# Patient Record
Sex: FEMALE | Race: WHITE | NOT HISPANIC OR LATINO | Employment: FULL TIME | URBAN - METROPOLITAN AREA
[De-identification: names, ages, dates, MRNs, and addresses within clinical notes are randomized per-mention and may not be internally consistent; named-entity substitution may affect disease eponyms.]

---

## 2023-05-09 DIAGNOSIS — D05.11 DUCTAL CARCINOMA IN SITU (DCIS) OF RIGHT BREAST: Primary | ICD-10-CM

## 2023-05-31 ENCOUNTER — OFFICE VISIT (OUTPATIENT)
Dept: HEMATOLOGY/ONCOLOGY | Facility: CLINIC | Age: 58
End: 2023-05-31
Payer: COMMERCIAL

## 2023-05-31 VITALS
SYSTOLIC BLOOD PRESSURE: 143 MMHG | BODY MASS INDEX: 34.11 KG/M2 | OXYGEN SATURATION: 97 % | WEIGHT: 185.38 LBS | RESPIRATION RATE: 20 BRPM | TEMPERATURE: 98 F | HEIGHT: 62 IN | HEART RATE: 76 BPM | DIASTOLIC BLOOD PRESSURE: 87 MMHG

## 2023-05-31 DIAGNOSIS — D05.11 DUCTAL CARCINOMA IN SITU (DCIS) OF RIGHT BREAST: ICD-10-CM

## 2023-05-31 PROCEDURE — 3077F SYST BP >= 140 MM HG: CPT | Mod: CPTII,,, | Performed by: INTERNAL MEDICINE

## 2023-05-31 PROCEDURE — 1159F PR MEDICATION LIST DOCUMENTED IN MEDICAL RECORD: ICD-10-PCS | Mod: CPTII,,, | Performed by: INTERNAL MEDICINE

## 2023-05-31 PROCEDURE — 3079F PR MOST RECENT DIASTOLIC BLOOD PRESSURE 80-89 MM HG: ICD-10-PCS | Mod: CPTII,,, | Performed by: INTERNAL MEDICINE

## 2023-05-31 PROCEDURE — 3008F BODY MASS INDEX DOCD: CPT | Mod: CPTII,,, | Performed by: INTERNAL MEDICINE

## 2023-05-31 PROCEDURE — 99205 PR OFFICE/OUTPT VISIT, NEW, LEVL V, 60-74 MIN: ICD-10-PCS | Mod: ,,, | Performed by: INTERNAL MEDICINE

## 2023-05-31 PROCEDURE — 3008F PR BODY MASS INDEX (BMI) DOCUMENTED: ICD-10-PCS | Mod: CPTII,,, | Performed by: INTERNAL MEDICINE

## 2023-05-31 PROCEDURE — 99205 OFFICE O/P NEW HI 60 MIN: CPT | Mod: ,,, | Performed by: INTERNAL MEDICINE

## 2023-05-31 PROCEDURE — 1160F RVW MEDS BY RX/DR IN RCRD: CPT | Mod: CPTII,,, | Performed by: INTERNAL MEDICINE

## 2023-05-31 PROCEDURE — 3079F DIAST BP 80-89 MM HG: CPT | Mod: CPTII,,, | Performed by: INTERNAL MEDICINE

## 2023-05-31 PROCEDURE — 1160F PR REVIEW ALL MEDS BY PRESCRIBER/CLIN PHARMACIST DOCUMENTED: ICD-10-PCS | Mod: CPTII,,, | Performed by: INTERNAL MEDICINE

## 2023-05-31 PROCEDURE — 3077F PR MOST RECENT SYSTOLIC BLOOD PRESSURE >= 140 MM HG: ICD-10-PCS | Mod: CPTII,,, | Performed by: INTERNAL MEDICINE

## 2023-05-31 PROCEDURE — 1159F MED LIST DOCD IN RCRD: CPT | Mod: CPTII,,, | Performed by: INTERNAL MEDICINE

## 2023-05-31 RX ORDER — ROSUVASTATIN CALCIUM 5 MG/1
TABLET, COATED ORAL
COMMUNITY

## 2023-05-31 RX ORDER — TRAZODONE HYDROCHLORIDE 50 MG/1
TABLET ORAL
COMMUNITY

## 2023-05-31 RX ORDER — VORTIOXETINE 10 MG/1
1 TABLET, FILM COATED ORAL
COMMUNITY

## 2023-05-31 RX ORDER — LISDEXAMFETAMINE DIMESYLATE 70 MG/1
70 CAPSULE ORAL EVERY MORNING
COMMUNITY
Start: 2023-05-10

## 2023-05-31 RX ORDER — LOSARTAN POTASSIUM AND HYDROCHLOROTHIAZIDE 12.5; 5 MG/1; MG/1
TABLET ORAL
COMMUNITY

## 2023-05-31 RX ORDER — LEVOTHYROXINE SODIUM 75 UG/1
75 TABLET ORAL
COMMUNITY
Start: 2023-05-15

## 2023-05-31 NOTE — PROGRESS NOTES
Cancer Center [facility]  Hematology/Oncology  Consult Note    Patient Name: Kathrin Swartz  MRN: [id number]  Admission Date: (Not on file)  Hospital Length of Stay: 0 days  Code Status: [unfilled]   Attending Provider:  Hussein Hayden MD  Consulting Provider: Alex Campos MD  Primary Care Physician:  Tawana Eng MD  Principal Problem:  DCIS of the right breast, without comedo necrosis, measuring only 2 centimeters, with all clear margins.    Consults  Subjective:   HPI:  Ms. Swartz, is a 58 years old lady, referred by Dr. Hayden.  Her primary care physician Dr. Tawana Eng, obtain a routine mammogram, in January 26, 2023.  There were calcifications in the upper outer right breast, there were of indeterminate etiology, and a stereotactic biopsy was recommended based on suspicious abnormality by mammogram BI-RADS 4.  On March 27, 2023 she underwent a stereotactic biopsy that revealed DCIS.  After the biopsy she a bilateral MRI, that reveal in enhancement, in the upper outer quadrant of the right breast, that measured 13 millimeters at the biopsy site.  On April 27, 2023 she underwent a lumpectomy, DCIS was found, measuring 20 millimeters, margins were all negative, there was no comedo necrosis, and no invasive carcinoma was found.  No lymph nodes were dissected or obtained..  The patient has healed well, and was referred to us, for further treatment if necessary.  She has already been seen by the radiation oncologist, for primary radiation therapy .  Results of the genetic and prognostic profile were not enclosed in the package, but I had a little discussion with her regarding the modest benefits of hormonal treatment in DCIS..    Comorbidities noted and include hypertension.    Surgical history:  Hysterectomy in 2 orthopedics procedure on the left elbow for what sounds like a an ulnar nerve compression rectal repair following the delivery of her twins, and another orthopedic  surgery that sounded like a removed until debris of the right shoulder.  She does not smoke or drink.    Oncology Treatment Plan:  Post lumpectomy primary radiation therapy        Medications:  Continuous Infusions:  Scheduled Meds:  PRN Meds:     Review of patient's allergies indicates:   Allergen Reactions    Codeine     Sulfa (sulfonamide antibiotics)     Penicillins Rash        Past Medical History:   Diagnosis Date    Depression     High cholesterol     HTN (hypertension)     Hypothyroidism, unspecified      Past Surgical History:   Procedure Laterality Date    BREAST BIOPSY      ELBOW SURGERY Left     KNEE SURGERY Left     PARTIAL HYSTERECTOMY      RECTAL PROLAPSE REPAIR      SHOULDER SURGERY Right      Family History       Problem Relation (Age of Onset)    Hyperlipidemia Mother    Hypertension Mother          Tobacco Use    Smoking status: Never    Smokeless tobacco: Never   Substance and Sexual Activity    Alcohol use: Yes     Alcohol/week: 6.0 standard drinks     Types: 6 Cans of beer per week    Drug use: Never    Sexual activity: Not on file       Review of Systems   All other systems reviewed and are negative.  Objective:     Vital Signs (Most Recent):  Temp: 98.1 °F (36.7 °C) (05/31/23 1141)  Pulse: 76 (05/31/23 1141)  Resp: 20 (05/31/23 1141)  BP: (!) 143/87 (05/31/23 1141)  SpO2: 97 % (05/31/23 1141) Vital Signs (24h Range):  [unfilled]     Weight: 84.1 kg (185 lb 6.4 oz)  Body mass index is 33.91 kg/m².  Body surface area is 1.92 meters squared.        Physical Exam  Constitutional:       Appearance: Normal appearance.   Cardiovascular:      Rate and Rhythm: Normal rate and regular rhythm.   Pulmonary:      Effort: Pulmonary effort is normal.      Breath sounds: Normal breath sounds.   Abdominal:      General: Abdomen is flat.      Palpations: Abdomen is soft.   Musculoskeletal:      Cervical back: Normal range of motion.   Neurological:      General: No focal deficit present.      Mental Status:  She is alert.   Psychiatric:         Mood and Affect: Mood normal.     Significant Labs:  The pathology report has been described in detail above  No routine labs enclosed.      Assessment/Plan:   DCIS of the right breast, with favorable features.  However prognostic , were not receive and will find them however I am inclined not to add additional hormonal therapy, since there is ample prove that survival is not increase or change in DCIS by adding hormonal therapy.  Given her noninvasive cancer, I have not give her a follow up appointment and she can returned to the care of Dr. Tawana Eng for routine mammograms.  Radiation Oncology is likely to order a follow up mammogram a few months after the completion of radiation therapy as baseline.    Thank you for your consult.     Alex Campos MD  Hematology/Oncology  Cancer Center [facility]